# Patient Record
Sex: MALE | Race: WHITE
[De-identification: names, ages, dates, MRNs, and addresses within clinical notes are randomized per-mention and may not be internally consistent; named-entity substitution may affect disease eponyms.]

---

## 2019-12-06 ENCOUNTER — HOSPITAL ENCOUNTER (INPATIENT)
Dept: HOSPITAL 94 - ER | Age: 64
LOS: 3 days | Discharge: HOME | DRG: 391 | End: 2019-12-09
Attending: INTERNAL MEDICINE | Admitting: INTERNAL MEDICINE
Payer: MEDICARE

## 2019-12-06 VITALS — DIASTOLIC BLOOD PRESSURE: 55 MMHG | SYSTOLIC BLOOD PRESSURE: 107 MMHG

## 2019-12-06 VITALS — DIASTOLIC BLOOD PRESSURE: 57 MMHG | SYSTOLIC BLOOD PRESSURE: 89 MMHG

## 2019-12-06 VITALS — SYSTOLIC BLOOD PRESSURE: 112 MMHG | DIASTOLIC BLOOD PRESSURE: 76 MMHG

## 2019-12-06 VITALS — DIASTOLIC BLOOD PRESSURE: 78 MMHG | SYSTOLIC BLOOD PRESSURE: 115 MMHG

## 2019-12-06 VITALS — SYSTOLIC BLOOD PRESSURE: 98 MMHG | DIASTOLIC BLOOD PRESSURE: 45 MMHG

## 2019-12-06 VITALS — DIASTOLIC BLOOD PRESSURE: 75 MMHG | SYSTOLIC BLOOD PRESSURE: 116 MMHG

## 2019-12-06 VITALS — HEIGHT: 65 IN | BODY MASS INDEX: 21.63 KG/M2 | WEIGHT: 129.85 LBS

## 2019-12-06 VITALS — SYSTOLIC BLOOD PRESSURE: 119 MMHG | DIASTOLIC BLOOD PRESSURE: 61 MMHG

## 2019-12-06 VITALS — SYSTOLIC BLOOD PRESSURE: 99 MMHG | DIASTOLIC BLOOD PRESSURE: 62 MMHG

## 2019-12-06 DIAGNOSIS — R00.0: ICD-10-CM

## 2019-12-06 DIAGNOSIS — I10: ICD-10-CM

## 2019-12-06 DIAGNOSIS — I48.91: ICD-10-CM

## 2019-12-06 DIAGNOSIS — Z23: ICD-10-CM

## 2019-12-06 DIAGNOSIS — K44.9: ICD-10-CM

## 2019-12-06 DIAGNOSIS — F10.20: ICD-10-CM

## 2019-12-06 DIAGNOSIS — Z80.1: ICD-10-CM

## 2019-12-06 DIAGNOSIS — N17.0: ICD-10-CM

## 2019-12-06 DIAGNOSIS — E87.6: ICD-10-CM

## 2019-12-06 DIAGNOSIS — N40.0: ICD-10-CM

## 2019-12-06 DIAGNOSIS — J44.9: ICD-10-CM

## 2019-12-06 DIAGNOSIS — E86.9: ICD-10-CM

## 2019-12-06 DIAGNOSIS — D75.1: ICD-10-CM

## 2019-12-06 DIAGNOSIS — K92.0: ICD-10-CM

## 2019-12-06 DIAGNOSIS — Z80.8: ICD-10-CM

## 2019-12-06 DIAGNOSIS — B18.2: ICD-10-CM

## 2019-12-06 DIAGNOSIS — K22.70: ICD-10-CM

## 2019-12-06 DIAGNOSIS — F31.9: ICD-10-CM

## 2019-12-06 DIAGNOSIS — F12.90: ICD-10-CM

## 2019-12-06 DIAGNOSIS — F17.210: ICD-10-CM

## 2019-12-06 DIAGNOSIS — M19.90: ICD-10-CM

## 2019-12-06 DIAGNOSIS — K70.9: ICD-10-CM

## 2019-12-06 DIAGNOSIS — E87.1: ICD-10-CM

## 2019-12-06 DIAGNOSIS — K21.0: Primary | ICD-10-CM

## 2019-12-06 LAB
ALBUMIN SERPL BCP-MCNC: 3.4 G/DL (ref 3.4–5)
ALBUMIN SERPL BCP-MCNC: 4.4 G/DL (ref 3.4–5)
ALBUMIN/GLOB SERPL: 1 {RATIO} (ref 1.1–1.5)
ALBUMIN/GLOB SERPL: 1.1 {RATIO} (ref 1.1–1.5)
ALP SERPL-CCNC: 65 IU/L (ref 46–116)
ALP SERPL-CCNC: 81 IU/L (ref 46–116)
ALT SERPL W P-5'-P-CCNC: 15 U/L (ref 12–78)
ALT SERPL W P-5'-P-CCNC: 24 U/L (ref 12–78)
ANION GAP SERPL CALCULATED.3IONS-SCNC: 11 MMOL/L (ref 8–16)
ANION GAP SERPL CALCULATED.3IONS-SCNC: 18 MMOL/L (ref 8–16)
AST SERPL W P-5'-P-CCNC: 34 U/L (ref 10–37)
AST SERPL W P-5'-P-CCNC: 34 U/L (ref 10–37)
BACTERIA URNS QL MICRO: (no result) /HPF
BASOPHILS # BLD AUTO: 0.1 X10'3 (ref 0–0.2)
BASOPHILS NFR BLD AUTO: 0.5 % (ref 0–1)
BILIRUB SERPL-MCNC: 0.9 MG/DL (ref 0.1–1)
BILIRUB SERPL-MCNC: 1.2 MG/DL (ref 0.1–1)
BUN SERPL-MCNC: 22 MG/DL (ref 7–18)
BUN SERPL-MCNC: 24 MG/DL (ref 7–18)
BUN/CREAT SERPL: 11.3 (ref 5.4–32)
BUN/CREAT SERPL: 13.2 (ref 5.4–32)
CALCIUM SERPL-MCNC: 10 MG/DL (ref 8.5–10.1)
CALCIUM SERPL-MCNC: 8.1 MG/DL (ref 8.5–10.1)
CHLORIDE SERPL-SCNC: 82 MMOL/L (ref 99–107)
CHLORIDE SERPL-SCNC: 90 MMOL/L (ref 99–107)
CLARITY UR: (no result)
CO2 SERPL-SCNC: 29.9 MMOL/L (ref 24–32)
CO2 SERPL-SCNC: 30.4 MMOL/L (ref 24–32)
COARSE GRAN CASTS URNS QL MICRO: (no result) /LPF
COLOR UR: YELLOW
CREAT SERPL-MCNC: 1.67 MG/DL (ref 0.6–1.1)
CREAT SERPL-MCNC: 2.13 MG/DL (ref 0.6–1.1)
DEPRECATED SQUAMOUS URNS QL MICRO: (no result) /LPF
EOSINOPHIL # BLD AUTO: 0 X10'3 (ref 0–0.9)
EOSINOPHIL NFR BLD AUTO: 0 % (ref 0–6)
ERYTHROCYTE [DISTWIDTH] IN BLOOD BY AUTOMATED COUNT: 13.7 % (ref 11.5–14.5)
ERYTHROCYTE [DISTWIDTH] IN BLOOD BY AUTOMATED COUNT: 13.8 % (ref 11.5–14.5)
GFR SERPL CREATININE-BSD FRML MDRD: 31 ML/MIN
GFR SERPL CREATININE-BSD FRML MDRD: 41 ML/MIN
GLUCOSE SERPL-MCNC: 133 MG/DL (ref 70–104)
GLUCOSE SERPL-MCNC: 92 MG/DL (ref 70–104)
GLUCOSE UR STRIP-MCNC: NEGATIVE MG/DL
HCT VFR BLD AUTO: 42.8 % (ref 42–52)
HCT VFR BLD AUTO: 52.1 % (ref 42–52)
HEMOCCULT STL QL: NEGATIVE
HGB BLD-MCNC: 15.2 G/DL (ref 14–17.9)
HGB BLD-MCNC: 18.3 G/DL (ref 14–17.9)
HGB UR QL STRIP: (no result)
HYALINE CASTS URNS QL MICRO: (no result) /LPF
KETONES UR STRIP-MCNC: >=80 MG/DL
LEUKOCYTE ESTERASE UR QL STRIP: NEGATIVE
LIPASE SERPL-CCNC: 55 U/L (ref 73–393)
LYMPHOCYTES # BLD AUTO: 1.2 X10'3 (ref 1.1–4.8)
LYMPHOCYTES NFR BLD AUTO: 7.3 % (ref 21–51)
LYMPHOCYTES NFR BLD MANUAL: 6 % (ref 21–51)
MACROCYTES BLD QL SMEAR: (no result)
MCH RBC QN AUTO: 33.9 PG (ref 27–31)
MCH RBC QN AUTO: 34.4 PG (ref 27–31)
MCHC RBC AUTO-ENTMCNC: 35.1 G/DL (ref 33–36.5)
MCHC RBC AUTO-ENTMCNC: 35.6 G/DL (ref 33–36.5)
MCV RBC AUTO: 96.6 FL (ref 78–98)
MCV RBC AUTO: 96.9 FL (ref 78–98)
MONOCYTES # BLD AUTO: 2.1 X10'3 (ref 0–0.9)
MONOCYTES NFR BLD AUTO: 13.2 % (ref 2–12)
MONOCYTES NFR BLD MANUAL: 12 % (ref 2–12)
MUCOUS THREADS URNS QL MICRO: (no result) /LPF
NEUTROPHILS # BLD AUTO: 12.6 X10'3 (ref 1.8–7.7)
NEUTROPHILS NFR BLD AUTO: 79 % (ref 42–75)
NEUTS BAND # BLD MANUAL: 7 % (ref 0–10)
NEUTS SEG NFR BLD MANUAL: 75 % (ref 42–75)
NITRITE UR QL STRIP: NEGATIVE
PH UR STRIP: 8 [PH] (ref 4.8–8)
PLATELET # BLD AUTO: 232 X10'3 (ref 140–440)
PLATELET # BLD AUTO: 314 X10'3 (ref 140–440)
PLATELET BLD QL SMEAR: NORMAL
PMV BLD AUTO: 7 FL (ref 7.4–10.4)
PMV BLD AUTO: 7.5 FL (ref 7.4–10.4)
POTASSIUM SERPL-SCNC: 3 MMOL/L (ref 3.5–5.1)
POTASSIUM SERPL-SCNC: 3 MMOL/L (ref 3.5–5.1)
PROT SERPL-MCNC: 6.7 G/DL (ref 6.4–8.2)
PROT SERPL-MCNC: 8.5 G/DL (ref 6.4–8.2)
PROT UR QL STRIP: 30 MG/DL
RBC # BLD AUTO: 4.42 X10'6 (ref 4.7–6.1)
RBC # BLD AUTO: 5.39 X10'6 (ref 4.7–6.1)
RBC #/AREA URNS HPF: (no result) /HPF (ref 0–2)
RBC MORPH BLD: (no result)
RENAL EPI CELLS URNS QL MICRO: (no result) /HPF
SODIUM SERPL-SCNC: 130 MMOL/L (ref 135–145)
SODIUM SERPL-SCNC: 131 MMOL/L (ref 135–145)
SP GR UR STRIP: 1.01 (ref 1–1.03)
TOTAL CELLS COUNTED FLD: 100
TRANS CELLS URNS QL MICRO: (no result) /HPF
TROPONIN I SERPL-MCNC: 0.22 NG/ML (ref 0–0.05)
URN COLLECT METHOD CLASS: (no result)
UROBILINOGEN UR STRIP-MCNC: 0.2 E.U/DL (ref 0.2–1)
WBC # BLD AUTO: 16 X10'3 (ref 4.5–11)
WBC # BLD AUTO: 17.4 X10'3 (ref 4.5–11)
WBC #/AREA URNS HPF: (no result) /HPF (ref 0–4)

## 2019-12-06 PROCEDURE — 86900 BLOOD TYPING SEROLOGIC ABO: CPT

## 2019-12-06 PROCEDURE — 83690 ASSAY OF LIPASE: CPT

## 2019-12-06 PROCEDURE — 99152 MOD SED SAME PHYS/QHP 5/>YRS: CPT

## 2019-12-06 PROCEDURE — 71045 X-RAY EXAM CHEST 1 VIEW: CPT

## 2019-12-06 PROCEDURE — 88305 TISSUE EXAM BY PATHOLOGIST: CPT

## 2019-12-06 PROCEDURE — 0DB58ZX EXCISION OF ESOPHAGUS, VIA NATURAL OR ARTIFICIAL OPENING ENDOSCOPIC, DIAGNOSTIC: ICD-10-PCS | Performed by: SPECIALIST

## 2019-12-06 PROCEDURE — 86885 COOMBS TEST INDIRECT QUAL: CPT

## 2019-12-06 PROCEDURE — 86901 BLOOD TYPING SEROLOGIC RH(D): CPT

## 2019-12-06 PROCEDURE — 93306 TTE W/DOPPLER COMPLETE: CPT

## 2019-12-06 PROCEDURE — 96374 THER/PROPH/DIAG INJ IV PUSH: CPT

## 2019-12-06 PROCEDURE — 88312 SPECIAL STAINS GROUP 1: CPT

## 2019-12-06 PROCEDURE — 87081 CULTURE SCREEN ONLY: CPT

## 2019-12-06 PROCEDURE — 82272 OCCULT BLD FECES 1-3 TESTS: CPT

## 2019-12-06 PROCEDURE — 94760 N-INVAS EAR/PLS OXIMETRY 1: CPT

## 2019-12-06 PROCEDURE — 85027 COMPLETE CBC AUTOMATED: CPT

## 2019-12-06 PROCEDURE — 96375 TX/PRO/DX INJ NEW DRUG ADDON: CPT

## 2019-12-06 PROCEDURE — 84484 ASSAY OF TROPONIN QUANT: CPT

## 2019-12-06 PROCEDURE — 80053 COMPREHEN METABOLIC PANEL: CPT

## 2019-12-06 PROCEDURE — 36415 COLL VENOUS BLD VENIPUNCTURE: CPT

## 2019-12-06 PROCEDURE — 90732 PPSV23 VACC 2 YRS+ SUBQ/IM: CPT

## 2019-12-06 PROCEDURE — 99285 EMERGENCY DEPT VISIT HI MDM: CPT

## 2019-12-06 PROCEDURE — 93005 ELECTROCARDIOGRAM TRACING: CPT

## 2019-12-06 PROCEDURE — 80048 BASIC METABOLIC PNL TOTAL CA: CPT

## 2019-12-06 PROCEDURE — 80305 DRUG TEST PRSMV DIR OPT OBS: CPT

## 2019-12-06 PROCEDURE — 43239 EGD BIOPSY SINGLE/MULTIPLE: CPT

## 2019-12-06 PROCEDURE — 94640 AIRWAY INHALATION TREATMENT: CPT

## 2019-12-06 PROCEDURE — 81001 URINALYSIS AUTO W/SCOPE: CPT

## 2019-12-06 PROCEDURE — 84145 PROCALCITONIN (PCT): CPT

## 2019-12-06 PROCEDURE — 83605 ASSAY OF LACTIC ACID: CPT

## 2019-12-06 PROCEDURE — 3E0234Z INTRODUCTION OF SERUM, TOXOID AND VACCINE INTO MUSCLE, PERCUTANEOUS APPROACH: ICD-10-PCS | Performed by: SPECIALIST

## 2019-12-06 PROCEDURE — 87040 BLOOD CULTURE FOR BACTERIA: CPT

## 2019-12-06 PROCEDURE — 83735 ASSAY OF MAGNESIUM: CPT

## 2019-12-06 PROCEDURE — 85025 COMPLETE CBC W/AUTO DIFF WBC: CPT

## 2019-12-06 PROCEDURE — 85610 PROTHROMBIN TIME: CPT

## 2019-12-06 RX ADMIN — POTASSIUM CHLORIDE PRN MLS/HR: 7.46 INJECTION, SOLUTION INTRAVENOUS at 20:50

## 2019-12-06 RX ADMIN — POTASSIUM CHLORIDE PRN MLS/HR: 7.46 INJECTION, SOLUTION INTRAVENOUS at 16:20

## 2019-12-06 RX ADMIN — POTASSIUM CHLORIDE PRN MLS/HR: 7.46 INJECTION, SOLUTION INTRAVENOUS at 22:44

## 2019-12-06 RX ADMIN — POTASSIUM CHLORIDE PRN MLS/HR: 7.46 INJECTION, SOLUTION INTRAVENOUS at 23:38

## 2019-12-06 RX ADMIN — POTASSIUM CHLORIDE PRN MLS/HR: 7.46 INJECTION, SOLUTION INTRAVENOUS at 14:34

## 2019-12-06 RX ADMIN — PANTOPRAZOLE SODIUM SCH MLS/HR: 40 INJECTION, POWDER, FOR SOLUTION INTRAVENOUS at 21:58

## 2019-12-06 RX ADMIN — SODIUM CHLORIDE SCH MLS/HR: 9 INJECTION INTRAMUSCULAR; INTRAVENOUS; SUBCUTANEOUS at 20:56

## 2019-12-06 RX ADMIN — SODIUM CHLORIDE SCH MLS/HR: 9 INJECTION INTRAMUSCULAR; INTRAVENOUS; SUBCUTANEOUS at 11:49

## 2019-12-06 RX ADMIN — POTASSIUM CHLORIDE PRN MLS/HR: 7.46 INJECTION, SOLUTION INTRAVENOUS at 19:30

## 2019-12-06 RX ADMIN — SODIUM CHLORIDE SCH MLS/HR: 9 INJECTION INTRAMUSCULAR; INTRAVENOUS; SUBCUTANEOUS at 21:52

## 2019-12-06 RX ADMIN — POTASSIUM CHLORIDE PRN MLS/HR: 7.46 INJECTION, SOLUTION INTRAVENOUS at 21:48

## 2019-12-06 RX ADMIN — PANTOPRAZOLE SODIUM SCH MLS/HR: 40 INJECTION, POWDER, FOR SOLUTION INTRAVENOUS at 11:48

## 2019-12-06 RX ADMIN — PANTOPRAZOLE SODIUM SCH MLS/HR: 40 INJECTION, POWDER, FOR SOLUTION INTRAVENOUS at 16:25

## 2019-12-06 RX ADMIN — BUDESONIDE SCH MG: 0.5 INHALANT RESPIRATORY (INHALATION) at 22:17

## 2019-12-06 NOTE — NUR
Patient in room PCU 3025. I have received report from Horace RENAE and had the opportunity to 
ask questions and assume patient care.  patient just returned from the GI lab.  He is still 
sleeping and is now resting with no sign of distress.

## 2019-12-06 NOTE — NUR
Problems reprioritized. Patient report given, questions answered & plan of care reviewed 
with OC Kellogg.

## 2019-12-06 NOTE — NUR
Arrived to  3022A @ 1235 via gurney, he was able to transfer to bed with sba. Brought up 
by OC Guaman Normal

## 2019-12-07 VITALS — SYSTOLIC BLOOD PRESSURE: 102 MMHG | DIASTOLIC BLOOD PRESSURE: 75 MMHG

## 2019-12-07 VITALS — SYSTOLIC BLOOD PRESSURE: 103 MMHG | DIASTOLIC BLOOD PRESSURE: 66 MMHG

## 2019-12-07 VITALS — SYSTOLIC BLOOD PRESSURE: 95 MMHG | DIASTOLIC BLOOD PRESSURE: 57 MMHG

## 2019-12-07 VITALS — DIASTOLIC BLOOD PRESSURE: 62 MMHG | SYSTOLIC BLOOD PRESSURE: 94 MMHG

## 2019-12-07 VITALS — DIASTOLIC BLOOD PRESSURE: 69 MMHG | SYSTOLIC BLOOD PRESSURE: 113 MMHG

## 2019-12-07 VITALS — SYSTOLIC BLOOD PRESSURE: 128 MMHG | DIASTOLIC BLOOD PRESSURE: 81 MMHG

## 2019-12-07 LAB
ALBUMIN SERPL BCP-MCNC: 2.8 G/DL (ref 3.4–5)
AMPHETAMINES UR QL SCN: NEGATIVE
ANION GAP SERPL CALCULATED.3IONS-SCNC: 13 MMOL/L (ref 8–16)
BARBITURATES UR QL SCN: NEGATIVE
BASOPHILS # BLD AUTO: 0 X10'3 (ref 0–0.2)
BASOPHILS NFR BLD AUTO: 0.1 % (ref 0–1)
BENZODIAZ UR QL SCN: POSITIVE
BUN SERPL-MCNC: 13 MG/DL (ref 7–18)
BUN/CREAT SERPL: 14.3 (ref 5.4–32)
BZE UR QL SCN: NEGATIVE
CALCIUM SERPL-MCNC: 7.6 MG/DL (ref 8.5–10.1)
CANNABINOIDS UR QL SCN: POSITIVE
CHLORIDE SERPL-SCNC: 99 MMOL/L (ref 99–107)
CO2 SERPL-SCNC: 20.9 MMOL/L (ref 24–32)
CREAT SERPL-MCNC: 0.91 MG/DL (ref 0.6–1.1)
EOSINOPHIL # BLD AUTO: 0 X10'3 (ref 0–0.9)
EOSINOPHIL NFR BLD AUTO: 0 % (ref 0–6)
ERYTHROCYTE [DISTWIDTH] IN BLOOD BY AUTOMATED COUNT: 14.1 % (ref 11.5–14.5)
GFR SERPL CREATININE-BSD FRML MDRD: 83 ML/MIN
GLUCOSE SERPL-MCNC: 86 MG/DL (ref 70–104)
HCT VFR BLD AUTO: 37 % (ref 42–52)
HGB BLD-MCNC: 12.9 G/DL (ref 14–17.9)
LYMPHOCYTES # BLD AUTO: 1.3 X10'3 (ref 1.1–4.8)
LYMPHOCYTES NFR BLD AUTO: 12 % (ref 21–51)
MAGNESIUM SERPL-MCNC: 1.6 MG/DL (ref 1.5–2.4)
MCH RBC QN AUTO: 34.5 PG (ref 27–31)
MCHC RBC AUTO-ENTMCNC: 34.9 G/DL (ref 33–36.5)
MCV RBC AUTO: 98.6 FL (ref 78–98)
METHADONE UR QL SCN: NEGATIVE
MONOCYTES # BLD AUTO: 1.4 X10'3 (ref 0–0.9)
MONOCYTES NFR BLD AUTO: 13 % (ref 2–12)
NEUTROPHILS # BLD AUTO: 8.2 X10'3 (ref 1.8–7.7)
NEUTROPHILS NFR BLD AUTO: 74.9 % (ref 42–75)
OPIATES UR QL SCN: NEGATIVE
PCP UR QL SCN: NEGATIVE
PLATELET # BLD AUTO: 199 X10'3 (ref 140–440)
PMV BLD AUTO: 7 FL (ref 7.4–10.4)
POTASSIUM SERPL-SCNC: 3.4 MMOL/L (ref 3.5–5.1)
RBC # BLD AUTO: 3.75 X10'6 (ref 4.7–6.1)
SODIUM SERPL-SCNC: 133 MMOL/L (ref 135–145)
WBC # BLD AUTO: 10.9 X10'3 (ref 4.5–11)

## 2019-12-07 RX ADMIN — BUDESONIDE SCH MG: 0.5 INHALANT RESPIRATORY (INHALATION) at 08:15

## 2019-12-07 RX ADMIN — IPRATROPIUM BROMIDE AND ALBUTEROL SULFATE SCH ML: .5; 3 SOLUTION RESPIRATORY (INHALATION) at 03:02

## 2019-12-07 RX ADMIN — PANTOPRAZOLE SODIUM SCH MLS/HR: 40 INJECTION, POWDER, FOR SOLUTION INTRAVENOUS at 07:15

## 2019-12-07 RX ADMIN — PANTOPRAZOLE SODIUM SCH MLS/HR: 40 INJECTION, POWDER, FOR SOLUTION INTRAVENOUS at 02:00

## 2019-12-07 RX ADMIN — POTASSIUM CHLORIDE PRN MLS/HR: 7.46 INJECTION, SOLUTION INTRAVENOUS at 00:50

## 2019-12-07 RX ADMIN — IPRATROPIUM BROMIDE AND ALBUTEROL SULFATE SCH ML: .5; 3 SOLUTION RESPIRATORY (INHALATION) at 08:16

## 2019-12-07 RX ADMIN — POTASSIUM CHLORIDE PRN MEQ: 1500 TABLET, EXTENDED RELEASE ORAL at 08:35

## 2019-12-07 RX ADMIN — ONDANSETRON PRN MG: 2 INJECTION, SOLUTION INTRAMUSCULAR; INTRAVENOUS at 17:55

## 2019-12-07 RX ADMIN — BUDESONIDE SCH MG: 0.5 INHALANT RESPIRATORY (INHALATION) at 20:30

## 2019-12-07 RX ADMIN — DILTIAZEM HYDROCHLORIDE SCH MG: 30 TABLET, FILM COATED ORAL at 20:00

## 2019-12-07 RX ADMIN — LEVALBUTEROL SCH MG: 0.63 SOLUTION RESPIRATORY (INHALATION) at 15:12

## 2019-12-07 RX ADMIN — LEVALBUTEROL SCH MG: 0.63 SOLUTION RESPIRATORY (INHALATION) at 20:30

## 2019-12-07 NOTE — NUR
Problems reprioritized. Patient report given, questions answered & plan of care reviewed 
with MJ Carcamo RN.Patient is resting with no aparaent distress.

## 2019-12-07 NOTE — NUR
HR [id number] again, EKG shows a-fib w/RVR. Dr Behl aware, gave 5mg IVP diltiazem per 
order, will continue to monitor

## 2019-12-07 NOTE — NUR
Problems reprioritized. Patient report given, questions answered & plan of care reviewed 
with OC [place].

## 2019-12-07 NOTE — NUR
HR sustaining 160s, a-fib w/RVR, spoke with Dr. Behl, will get EKG stat, cardizem 5mg IVP 
and urine tox screen

## 2019-12-08 VITALS — SYSTOLIC BLOOD PRESSURE: 118 MMHG | DIASTOLIC BLOOD PRESSURE: 75 MMHG

## 2019-12-08 VITALS — DIASTOLIC BLOOD PRESSURE: 80 MMHG | SYSTOLIC BLOOD PRESSURE: 140 MMHG

## 2019-12-08 VITALS — DIASTOLIC BLOOD PRESSURE: 69 MMHG | SYSTOLIC BLOOD PRESSURE: 107 MMHG

## 2019-12-08 VITALS — SYSTOLIC BLOOD PRESSURE: 125 MMHG | DIASTOLIC BLOOD PRESSURE: 81 MMHG

## 2019-12-08 VITALS — SYSTOLIC BLOOD PRESSURE: 121 MMHG | DIASTOLIC BLOOD PRESSURE: 82 MMHG

## 2019-12-08 VITALS — DIASTOLIC BLOOD PRESSURE: 80 MMHG | SYSTOLIC BLOOD PRESSURE: 139 MMHG

## 2019-12-08 LAB
ALBUMIN SERPL BCP-MCNC: 2.8 G/DL (ref 3.4–5)
ANION GAP SERPL CALCULATED.3IONS-SCNC: 7 MMOL/L (ref 8–16)
BASOPHILS # BLD AUTO: 0 X10'3 (ref 0–0.2)
BASOPHILS NFR BLD AUTO: 0.3 % (ref 0–1)
BUN SERPL-MCNC: 6 MG/DL (ref 7–18)
BUN/CREAT SERPL: 7.8 (ref 5.4–32)
CALCIUM SERPL-MCNC: 8.1 MG/DL (ref 8.5–10.1)
CHLORIDE SERPL-SCNC: 99 MMOL/L (ref 99–107)
CO2 SERPL-SCNC: 26.8 MMOL/L (ref 24–32)
CREAT SERPL-MCNC: 0.77 MG/DL (ref 0.6–1.1)
EOSINOPHIL # BLD AUTO: 0 X10'3 (ref 0–0.9)
EOSINOPHIL NFR BLD AUTO: 0.5 % (ref 0–6)
ERYTHROCYTE [DISTWIDTH] IN BLOOD BY AUTOMATED COUNT: 13.7 % (ref 11.5–14.5)
GFR SERPL CREATININE-BSD FRML MDRD: > 90 ML/MIN
GLUCOSE SERPL-MCNC: 108 MG/DL (ref 70–104)
HCT VFR BLD AUTO: 36.8 % (ref 42–52)
HGB BLD-MCNC: 12.6 G/DL (ref 14–17.9)
LYMPHOCYTES # BLD AUTO: 2 X10'3 (ref 1.1–4.8)
LYMPHOCYTES NFR BLD AUTO: 24.9 % (ref 21–51)
MAGNESIUM SERPL-MCNC: 1.6 MG/DL (ref 1.5–2.4)
MCH RBC QN AUTO: 34 PG (ref 27–31)
MCHC RBC AUTO-ENTMCNC: 34.3 G/DL (ref 33–36.5)
MCV RBC AUTO: 98.9 FL (ref 78–98)
MONOCYTES # BLD AUTO: 1.1 X10'3 (ref 0–0.9)
MONOCYTES NFR BLD AUTO: 14.2 % (ref 2–12)
NEUTROPHILS # BLD AUTO: 4.8 X10'3 (ref 1.8–7.7)
NEUTROPHILS NFR BLD AUTO: 60.1 % (ref 42–75)
PLATELET # BLD AUTO: 203 X10'3 (ref 140–440)
PMV BLD AUTO: 7 FL (ref 7.4–10.4)
POTASSIUM SERPL-SCNC: 3.3 MMOL/L (ref 3.5–5.1)
RBC # BLD AUTO: 3.72 X10'6 (ref 4.7–6.1)
SODIUM SERPL-SCNC: 133 MMOL/L (ref 135–145)
WBC # BLD AUTO: 7.9 X10'3 (ref 4.5–11)

## 2019-12-08 RX ADMIN — DILTIAZEM HYDROCHLORIDE SCH MG: 30 TABLET, FILM COATED ORAL at 07:39

## 2019-12-08 RX ADMIN — DILTIAZEM HYDROCHLORIDE SCH MG: 30 TABLET, FILM COATED ORAL at 13:58

## 2019-12-08 RX ADMIN — ONDANSETRON PRN MG: 2 INJECTION, SOLUTION INTRAMUSCULAR; INTRAVENOUS at 14:04

## 2019-12-08 RX ADMIN — DILTIAZEM HYDROCHLORIDE SCH MG: 30 TABLET, FILM COATED ORAL at 20:39

## 2019-12-08 RX ADMIN — POTASSIUM CHLORIDE PRN MEQ: 1500 TABLET, EXTENDED RELEASE ORAL at 07:40

## 2019-12-08 RX ADMIN — LEVALBUTEROL SCH MG: 0.63 SOLUTION RESPIRATORY (INHALATION) at 02:50

## 2019-12-08 RX ADMIN — LEVALBUTEROL SCH MG: 0.63 SOLUTION RESPIRATORY (INHALATION) at 14:20

## 2019-12-08 RX ADMIN — ONDANSETRON PRN MG: 2 INJECTION, SOLUTION INTRAMUSCULAR; INTRAVENOUS at 08:25

## 2019-12-08 RX ADMIN — BUDESONIDE SCH MG: 0.5 INHALANT RESPIRATORY (INHALATION) at 08:00

## 2019-12-08 RX ADMIN — ONDANSETRON PRN MG: 2 INJECTION, SOLUTION INTRAMUSCULAR; INTRAVENOUS at 20:42

## 2019-12-08 RX ADMIN — POTASSIUM CHLORIDE PRN MEQ: 1500 TABLET, EXTENDED RELEASE ORAL at 02:52

## 2019-12-08 RX ADMIN — DILTIAZEM HYDROCHLORIDE SCH MG: 30 TABLET, FILM COATED ORAL at 02:52

## 2019-12-08 RX ADMIN — LEVALBUTEROL SCH MG: 0.63 SOLUTION RESPIRATORY (INHALATION) at 20:06

## 2019-12-08 RX ADMIN — BUDESONIDE SCH MG: 0.5 INHALANT RESPIRATORY (INHALATION) at 20:06

## 2019-12-08 RX ADMIN — LEVALBUTEROL SCH MG: 0.63 SOLUTION RESPIRATORY (INHALATION) at 08:26

## 2019-12-08 NOTE — NUR
Patient did not tolerate diet well this AM. Patient continues on full liquid diet and 
shortly after meal, he began dry heaving and complained of severe nausea. Patient was given 
IV Zofran with effective results. Diet not advanced, MD is aware and the patient will stay 
another day for observation. Will continue to monitor.

## 2019-12-08 NOTE — NUR
Patient in room PCU 3025. I have received report from Judit   and had the opportunity to ask 
questions and assume patient care.

## 2019-12-08 NOTE — NUR
Patient in room U 3025. I have received report from OC Davis   and had the opportunity 
to ask questions and assume patient care. Pt resting with eyes closed.  no complaints at 
this time.

-------------------------------------------------------------------------------

Addendum: 12/08/19 at 1846 by Allyssa Roberts RN

-------------------------------------------------------------------------------

Amended: Links added.

## 2019-12-08 NOTE — NUR
Problems reprioritized. Patient report given, questions answered & plan of care reviewed 
with Venus.

## 2019-12-08 NOTE — NUR
Problems reprioritized. Patient report given, questions answered & plan of care reviewed 
with Susan RENAE.

## 2019-12-09 VITALS — DIASTOLIC BLOOD PRESSURE: 72 MMHG | SYSTOLIC BLOOD PRESSURE: 106 MMHG

## 2019-12-09 VITALS — DIASTOLIC BLOOD PRESSURE: 81 MMHG | SYSTOLIC BLOOD PRESSURE: 134 MMHG

## 2019-12-09 VITALS — SYSTOLIC BLOOD PRESSURE: 153 MMHG | DIASTOLIC BLOOD PRESSURE: 82 MMHG

## 2019-12-09 VITALS — DIASTOLIC BLOOD PRESSURE: 74 MMHG | SYSTOLIC BLOOD PRESSURE: 119 MMHG

## 2019-12-09 LAB
ALBUMIN SERPL BCP-MCNC: 3 G/DL (ref 3.4–5)
ANION GAP SERPL CALCULATED.3IONS-SCNC: 11 MMOL/L (ref 8–16)
BASOPHILS # BLD AUTO: 0 X10'3 (ref 0–0.2)
BASOPHILS NFR BLD AUTO: 0.4 % (ref 0–1)
BASOPHILS NFR BLD MANUAL: 1 % (ref 0–1)
BUN SERPL-MCNC: 6 MG/DL (ref 7–18)
BUN/CREAT SERPL: 8.3 (ref 5.4–32)
CALCIUM SERPL-MCNC: 8.4 MG/DL (ref 8.5–10.1)
CHLORIDE SERPL-SCNC: 97 MMOL/L (ref 99–107)
CO2 SERPL-SCNC: 22 MMOL/L (ref 24–32)
CREAT SERPL-MCNC: 0.72 MG/DL (ref 0.6–1.1)
EOSINOPHIL # BLD AUTO: 0.1 X10'3 (ref 0–0.9)
EOSINOPHIL NFR BLD AUTO: 0.8 % (ref 0–6)
EOSINOPHIL NFR BLD MANUAL: 2 % (ref 0–6)
ERYTHROCYTE [DISTWIDTH] IN BLOOD BY AUTOMATED COUNT: 13.4 % (ref 11.5–14.5)
GFR SERPL CREATININE-BSD FRML MDRD: > 90 ML/MIN
GIANT PLATELETS BLD QL SMEAR: (no result)
GLUCOSE SERPL-MCNC: 118 MG/DL (ref 70–104)
HCT VFR BLD AUTO: 38.7 % (ref 42–52)
HGB BLD-MCNC: 13.5 G/DL (ref 14–17.9)
LYMPHOCYTES # BLD AUTO: 2.3 X10'3 (ref 1.1–4.8)
LYMPHOCYTES NFR BLD AUTO: 31.2 % (ref 21–51)
LYMPHOCYTES NFR BLD MANUAL: 28 % (ref 21–51)
MAGNESIUM SERPL-MCNC: 1.5 MG/DL (ref 1.5–2.4)
MCH RBC QN AUTO: 34.4 PG (ref 27–31)
MCHC RBC AUTO-ENTMCNC: 34.9 G/DL (ref 33–36.5)
MCV RBC AUTO: 98.7 FL (ref 78–98)
MONOCYTES # BLD AUTO: 1.1 X10'3 (ref 0–0.9)
MONOCYTES NFR BLD AUTO: 15.1 % (ref 2–12)
MONOCYTES NFR BLD MANUAL: 15 % (ref 2–12)
NEUTROPHILS # BLD AUTO: 3.8 X10'3 (ref 1.8–7.7)
NEUTROPHILS NFR BLD AUTO: 52.5 % (ref 42–75)
NEUTS BAND # BLD MANUAL: 1 % (ref 0–10)
NEUTS SEG NFR BLD MANUAL: 53 % (ref 42–75)
PLATELET # BLD AUTO: 220 X10'3 (ref 140–440)
PLATELET BLD QL SMEAR: NORMAL
PMV BLD AUTO: 6.9 FL (ref 7.4–10.4)
POTASSIUM SERPL-SCNC: 3.1 MMOL/L (ref 3.5–5.1)
RBC # BLD AUTO: 3.92 X10'6 (ref 4.7–6.1)
RBC MORPH BLD: NORMAL
SODIUM SERPL-SCNC: 130 MMOL/L (ref 135–145)
TOTAL CELLS COUNTED FLD: 100
WBC # BLD AUTO: 7.3 X10'3 (ref 4.5–11)

## 2019-12-09 RX ADMIN — DILTIAZEM HYDROCHLORIDE SCH MG: 30 TABLET, FILM COATED ORAL at 07:17

## 2019-12-09 RX ADMIN — LEVALBUTEROL SCH MG: 0.63 SOLUTION RESPIRATORY (INHALATION) at 09:30

## 2019-12-09 RX ADMIN — ONDANSETRON PRN MG: 2 INJECTION, SOLUTION INTRAMUSCULAR; INTRAVENOUS at 12:53

## 2019-12-09 RX ADMIN — DILTIAZEM HYDROCHLORIDE SCH MG: 30 TABLET, FILM COATED ORAL at 02:09

## 2019-12-09 RX ADMIN — DILTIAZEM HYDROCHLORIDE SCH MG: 30 TABLET, FILM COATED ORAL at 13:16

## 2019-12-09 RX ADMIN — POTASSIUM CHLORIDE PRN MEQ: 1500 TABLET, EXTENDED RELEASE ORAL at 07:16

## 2019-12-09 RX ADMIN — LEVALBUTEROL SCH MG: 0.63 SOLUTION RESPIRATORY (INHALATION) at 15:25

## 2019-12-09 RX ADMIN — BUDESONIDE SCH MG: 0.5 INHALANT RESPIRATORY (INHALATION) at 09:30

## 2019-12-09 RX ADMIN — LEVALBUTEROL SCH MG: 0.63 SOLUTION RESPIRATORY (INHALATION) at 02:16

## 2019-12-09 NOTE — NUR
Pt discharged to son and son's spouse who are his usual caregivers. IV taken out, Tele dc'd 
and returned. All belongings taken from room. Pt and family educated on medications and meds 
called into [facility] in [facility] per son's request. Pt is stable with some 
intermittent nausea. Dr Behl aware of sodium and potassium levels. Replacement K+ given 
prior to discharge. All belongings taken from room. All questions answered.

## 2019-12-09 NOTE — NUR
Problems reprioritized. Patient report given, questions answered & plan of care reviewed 
with OC ESCOBAR. 

-------------------------------------------------------------------------------

Addendum: 12/09/19 at 0635 by Allyssa Roberts RN

-------------------------------------------------------------------------------

Amended: Links added.